# Patient Record
Sex: FEMALE | Race: WHITE | Employment: OTHER | URBAN - METROPOLITAN AREA
[De-identification: names, ages, dates, MRNs, and addresses within clinical notes are randomized per-mention and may not be internally consistent; named-entity substitution may affect disease eponyms.]

---

## 2020-04-04 ENCOUNTER — APPOINTMENT (OUTPATIENT)
Dept: GENERAL RADIOLOGY | Age: 65
DRG: 683 | End: 2020-04-04
Payer: MEDICARE

## 2020-04-04 ENCOUNTER — APPOINTMENT (OUTPATIENT)
Dept: CT IMAGING | Age: 65
DRG: 683 | End: 2020-04-04
Payer: MEDICARE

## 2020-04-04 ENCOUNTER — HOSPITAL ENCOUNTER (INPATIENT)
Age: 65
LOS: 3 days | Discharge: PSYCHIATRIC OR SUBSTANCE USE FACILITY | DRG: 683 | End: 2020-04-07
Attending: EMERGENCY MEDICINE | Admitting: INTERNAL MEDICINE
Payer: MEDICARE

## 2020-04-04 PROBLEM — N17.9 AKI (ACUTE KIDNEY INJURY) (HCC): Status: ACTIVE | Noted: 2020-04-04

## 2020-04-04 LAB
ACETAMINOPHEN LEVEL: <5 MCG/ML (ref 10–30)
ALBUMIN SERPL-MCNC: 3.9 G/DL (ref 3.5–5.2)
ALP BLD-CCNC: 81 U/L (ref 35–104)
ALT SERPL-CCNC: 28 U/L (ref 0–32)
ANION GAP SERPL CALCULATED.3IONS-SCNC: 15 MMOL/L (ref 7–16)
ANION GAP SERPL CALCULATED.3IONS-SCNC: 20 MMOL/L (ref 7–16)
AST SERPL-CCNC: 85 U/L (ref 0–31)
BASOPHILS ABSOLUTE: 0.03 E9/L (ref 0–0.2)
BASOPHILS RELATIVE PERCENT: 0.5 % (ref 0–2)
BILIRUB SERPL-MCNC: 0.3 MG/DL (ref 0–1.2)
BUN BLDV-MCNC: 50 MG/DL (ref 8–23)
BUN BLDV-MCNC: 52 MG/DL (ref 8–23)
CALCIUM SERPL-MCNC: 8.7 MG/DL (ref 8.6–10.2)
CALCIUM SERPL-MCNC: 9.1 MG/DL (ref 8.6–10.2)
CHLORIDE BLD-SCNC: 100 MMOL/L (ref 98–107)
CHLORIDE BLD-SCNC: 94 MMOL/L (ref 98–107)
CO2: 15 MMOL/L (ref 22–29)
CO2: 17 MMOL/L (ref 22–29)
CREAT SERPL-MCNC: 2.1 MG/DL (ref 0.5–1)
CREAT SERPL-MCNC: 2.2 MG/DL (ref 0.5–1)
EKG ATRIAL RATE: 86 BPM
EKG P AXIS: 47 DEGREES
EKG P-R INTERVAL: 148 MS
EKG Q-T INTERVAL: 404 MS
EKG QRS DURATION: 96 MS
EKG QTC CALCULATION (BAZETT): 483 MS
EKG R AXIS: 43 DEGREES
EKG T AXIS: 33 DEGREES
EKG VENTRICULAR RATE: 86 BPM
EOSINOPHILS ABSOLUTE: 0.07 E9/L (ref 0.05–0.5)
EOSINOPHILS RELATIVE PERCENT: 1.2 % (ref 0–6)
ETHANOL: <10 MG/DL (ref 0–0.08)
GFR AFRICAN AMERICAN: 27
GFR AFRICAN AMERICAN: 29
GFR NON-AFRICAN AMERICAN: 22 ML/MIN/1.73
GFR NON-AFRICAN AMERICAN: 24 ML/MIN/1.73
GLUCOSE BLD-MCNC: 102 MG/DL (ref 74–99)
GLUCOSE BLD-MCNC: 73 MG/DL (ref 74–99)
HCT VFR BLD CALC: 38.3 % (ref 34–48)
HEMOGLOBIN: 12.6 G/DL (ref 11.5–15.5)
IMMATURE GRANULOCYTES #: 0.02 E9/L
IMMATURE GRANULOCYTES %: 0.3 % (ref 0–5)
LACTIC ACID: 0.9 MMOL/L (ref 0.5–2.2)
LYMPHOCYTES ABSOLUTE: 1.79 E9/L (ref 1.5–4)
LYMPHOCYTES RELATIVE PERCENT: 30.7 % (ref 20–42)
MCH RBC QN AUTO: 31.2 PG (ref 26–35)
MCHC RBC AUTO-ENTMCNC: 32.9 % (ref 32–34.5)
MCV RBC AUTO: 94.8 FL (ref 80–99.9)
MONOCYTES ABSOLUTE: 0.63 E9/L (ref 0.1–0.95)
MONOCYTES RELATIVE PERCENT: 10.8 % (ref 2–12)
NEUTROPHILS ABSOLUTE: 3.29 E9/L (ref 1.8–7.3)
NEUTROPHILS RELATIVE PERCENT: 56.5 % (ref 43–80)
PDW BLD-RTO: 12.7 FL (ref 11.5–15)
PLATELET # BLD: 252 E9/L (ref 130–450)
PMV BLD AUTO: 11.5 FL (ref 7–12)
POTASSIUM REFLEX MAGNESIUM: 4.6 MMOL/L (ref 3.5–5)
POTASSIUM SERPL-SCNC: 3.7 MMOL/L (ref 3.5–5)
RBC # BLD: 4.04 E12/L (ref 3.5–5.5)
REASON FOR REJECTION: NORMAL
REJECTED TEST: NORMAL
SALICYLATE, SERUM: <0.3 MG/DL (ref 0–30)
SODIUM BLD-SCNC: 129 MMOL/L (ref 132–146)
SODIUM BLD-SCNC: 132 MMOL/L (ref 132–146)
TOTAL PROTEIN: 6.8 G/DL (ref 6.4–8.3)
TRICYCLIC ANTIDEPRESSANTS SCREEN SERUM: POSITIVE NG/ML
TROPONIN: <0.01 NG/ML (ref 0–0.03)
WBC # BLD: 5.8 E9/L (ref 4.5–11.5)

## 2020-04-04 PROCEDURE — 80053 COMPREHEN METABOLIC PANEL: CPT

## 2020-04-04 PROCEDURE — 93005 ELECTROCARDIOGRAM TRACING: CPT | Performed by: STUDENT IN AN ORGANIZED HEALTH CARE EDUCATION/TRAINING PROGRAM

## 2020-04-04 PROCEDURE — 84484 ASSAY OF TROPONIN QUANT: CPT

## 2020-04-04 PROCEDURE — 87040 BLOOD CULTURE FOR BACTERIA: CPT

## 2020-04-04 PROCEDURE — 36415 COLL VENOUS BLD VENIPUNCTURE: CPT

## 2020-04-04 PROCEDURE — 80307 DRUG TEST PRSMV CHEM ANLYZR: CPT

## 2020-04-04 PROCEDURE — 70450 CT HEAD/BRAIN W/O DYE: CPT

## 2020-04-04 PROCEDURE — 2580000003 HC RX 258: Performed by: INTERNAL MEDICINE

## 2020-04-04 PROCEDURE — 99285 EMERGENCY DEPT VISIT HI MDM: CPT

## 2020-04-04 PROCEDURE — 93010 ELECTROCARDIOGRAM REPORT: CPT | Performed by: INTERNAL MEDICINE

## 2020-04-04 PROCEDURE — 71045 X-RAY EXAM CHEST 1 VIEW: CPT

## 2020-04-04 PROCEDURE — 72125 CT NECK SPINE W/O DYE: CPT

## 2020-04-04 PROCEDURE — 85025 COMPLETE CBC W/AUTO DIFF WBC: CPT

## 2020-04-04 PROCEDURE — 1200000000 HC SEMI PRIVATE

## 2020-04-04 PROCEDURE — 80048 BASIC METABOLIC PNL TOTAL CA: CPT

## 2020-04-04 PROCEDURE — 6370000000 HC RX 637 (ALT 250 FOR IP): Performed by: INTERNAL MEDICINE

## 2020-04-04 PROCEDURE — 83605 ASSAY OF LACTIC ACID: CPT

## 2020-04-04 PROCEDURE — G0480 DRUG TEST DEF 1-7 CLASSES: HCPCS

## 2020-04-04 RX ORDER — CYCLOBENZAPRINE HCL 10 MG
10 TABLET ORAL 3 TIMES DAILY PRN
Status: DISCONTINUED | OUTPATIENT
Start: 2020-04-04 | End: 2020-04-04

## 2020-04-04 RX ORDER — MONTELUKAST SODIUM 10 MG/1
10 TABLET ORAL NIGHTLY
Status: DISCONTINUED | OUTPATIENT
Start: 2020-04-04 | End: 2020-04-07 | Stop reason: HOSPADM

## 2020-04-04 RX ORDER — SODIUM CHLORIDE 0.9 % (FLUSH) 0.9 %
10 SYRINGE (ML) INJECTION EVERY 12 HOURS SCHEDULED
Status: DISCONTINUED | OUTPATIENT
Start: 2020-04-04 | End: 2020-04-07 | Stop reason: HOSPADM

## 2020-04-04 RX ORDER — CYCLOBENZAPRINE HCL 10 MG
10 TABLET ORAL 3 TIMES DAILY
COMMUNITY
Start: 2020-02-12

## 2020-04-04 RX ORDER — 0.9 % SODIUM CHLORIDE 0.9 %
1000 INTRAVENOUS SOLUTION INTRAVENOUS ONCE
Status: DISCONTINUED | OUTPATIENT
Start: 2020-04-04 | End: 2020-04-07 | Stop reason: HOSPADM

## 2020-04-04 RX ORDER — FLUTICASONE PROPIONATE 50 MCG
2 SPRAY, SUSPENSION (ML) NASAL DAILY
COMMUNITY
Start: 2019-12-30

## 2020-04-04 RX ORDER — HYDROCODONE BITARTRATE AND ACETAMINOPHEN 7.5; 325 MG/1; MG/1
1 TABLET ORAL EVERY 6 HOURS PRN
Status: DISCONTINUED | OUTPATIENT
Start: 2020-04-04 | End: 2020-04-04

## 2020-04-04 RX ORDER — HYDROCODONE BITARTRATE AND ACETAMINOPHEN 10; 325 MG/1; MG/1
1 TABLET ORAL EVERY 4 HOURS PRN
Status: DISCONTINUED | OUTPATIENT
Start: 2020-04-04 | End: 2020-04-07 | Stop reason: HOSPADM

## 2020-04-04 RX ORDER — SODIUM CHLORIDE 0.9 % (FLUSH) 0.9 %
10 SYRINGE (ML) INJECTION PRN
Status: DISCONTINUED | OUTPATIENT
Start: 2020-04-04 | End: 2020-04-07 | Stop reason: HOSPADM

## 2020-04-04 RX ORDER — POLYETHYLENE GLYCOL 3350 17 G/17G
17 POWDER, FOR SOLUTION ORAL DAILY PRN
Status: DISCONTINUED | OUTPATIENT
Start: 2020-04-04 | End: 2020-04-07 | Stop reason: HOSPADM

## 2020-04-04 RX ORDER — SODIUM CHLORIDE 9 MG/ML
INJECTION, SOLUTION INTRAVENOUS CONTINUOUS
Status: DISCONTINUED | OUTPATIENT
Start: 2020-04-05 | End: 2020-04-05

## 2020-04-04 RX ORDER — LORATADINE 10 MG/1
10 TABLET ORAL DAILY PRN
COMMUNITY
Start: 2020-02-03

## 2020-04-04 RX ORDER — ALPRAZOLAM 1 MG/1
1 TABLET ORAL 2 TIMES DAILY PRN
COMMUNITY
Start: 2020-03-06

## 2020-04-04 RX ORDER — PROMETHAZINE HYDROCHLORIDE 25 MG/1
12.5 TABLET ORAL EVERY 6 HOURS PRN
Status: DISCONTINUED | OUTPATIENT
Start: 2020-04-04 | End: 2020-04-07 | Stop reason: HOSPADM

## 2020-04-04 RX ORDER — HYDROCODONE BITARTRATE AND ACETAMINOPHEN 10; 325 MG/1; MG/1
1 TABLET ORAL EVERY 4 HOURS PRN
COMMUNITY
Start: 2020-02-28

## 2020-04-04 RX ORDER — ACETAMINOPHEN 650 MG/1
650 SUPPOSITORY RECTAL EVERY 6 HOURS PRN
Status: DISCONTINUED | OUTPATIENT
Start: 2020-04-04 | End: 2020-04-04

## 2020-04-04 RX ORDER — QUETIAPINE FUMARATE 100 MG/1
100 TABLET, FILM COATED ORAL 2 TIMES DAILY
Status: ON HOLD | COMMUNITY
End: 2020-04-07 | Stop reason: HOSPADM

## 2020-04-04 RX ORDER — SODIUM CHLORIDE 9 MG/ML
INJECTION, SOLUTION INTRAVENOUS CONTINUOUS
Status: DISCONTINUED | OUTPATIENT
Start: 2020-04-04 | End: 2020-04-05

## 2020-04-04 RX ORDER — ONDANSETRON 2 MG/ML
4 INJECTION INTRAMUSCULAR; INTRAVENOUS EVERY 6 HOURS PRN
Status: DISCONTINUED | OUTPATIENT
Start: 2020-04-04 | End: 2020-04-07 | Stop reason: HOSPADM

## 2020-04-04 RX ORDER — ACETAMINOPHEN 325 MG/1
650 TABLET ORAL EVERY 6 HOURS PRN
Status: DISCONTINUED | OUTPATIENT
Start: 2020-04-04 | End: 2020-04-07 | Stop reason: HOSPADM

## 2020-04-04 RX ORDER — CYCLOBENZAPRINE HCL 10 MG
10 TABLET ORAL 3 TIMES DAILY
Status: DISCONTINUED | OUTPATIENT
Start: 2020-04-04 | End: 2020-04-07 | Stop reason: HOSPADM

## 2020-04-04 RX ORDER — QUETIAPINE FUMARATE 100 MG/1
100 TABLET, FILM COATED ORAL 2 TIMES DAILY
Status: DISCONTINUED | OUTPATIENT
Start: 2020-04-04 | End: 2020-04-06

## 2020-04-04 RX ORDER — HYDROCODONE BITARTRATE AND ACETAMINOPHEN 5; 325 MG/1; MG/1
1 TABLET ORAL EVERY 6 HOURS PRN
Status: DISCONTINUED | OUTPATIENT
Start: 2020-04-04 | End: 2020-04-04

## 2020-04-04 RX ORDER — LORAZEPAM 2 MG/ML
0.5 INJECTION INTRAMUSCULAR EVERY 12 HOURS PRN
Status: DISCONTINUED | OUTPATIENT
Start: 2020-04-04 | End: 2020-04-07 | Stop reason: HOSPADM

## 2020-04-04 RX ORDER — FLUTICASONE PROPIONATE 50 MCG
2 SPRAY, SUSPENSION (ML) NASAL DAILY
Status: DISCONTINUED | OUTPATIENT
Start: 2020-04-04 | End: 2020-04-07 | Stop reason: HOSPADM

## 2020-04-04 RX ORDER — MONTELUKAST SODIUM 10 MG/1
10 TABLET ORAL NIGHTLY
COMMUNITY
Start: 2020-01-16

## 2020-04-04 RX ORDER — LOSARTAN POTASSIUM 50 MG/1
50 TABLET ORAL DAILY
Status: ON HOLD | COMMUNITY
Start: 2019-08-07 | End: 2020-04-07 | Stop reason: HOSPADM

## 2020-04-04 RX ORDER — ACETAMINOPHEN 325 MG/1
650 TABLET ORAL EVERY 6 HOURS PRN
Status: DISCONTINUED | OUTPATIENT
Start: 2020-04-04 | End: 2020-04-04

## 2020-04-04 RX ADMIN — MONTELUKAST SODIUM 10 MG: 10 TABLET, FILM COATED ORAL at 21:01

## 2020-04-04 RX ADMIN — QUETIAPINE FUMARATE 100 MG: 100 TABLET ORAL at 21:02

## 2020-04-04 RX ADMIN — Medication 10 ML: at 21:07

## 2020-04-04 RX ADMIN — HYDROCODONE BITARTRATE AND ACETAMINOPHEN 1 TABLET: 10; 325 TABLET ORAL at 21:02

## 2020-04-04 RX ADMIN — SODIUM CHLORIDE: 9 INJECTION, SOLUTION INTRAVENOUS at 19:47

## 2020-04-04 RX ADMIN — CYCLOBENZAPRINE 10 MG: 10 TABLET, FILM COATED ORAL at 21:03

## 2020-04-04 ASSESSMENT — PAIN SCALES - GENERAL
PAINLEVEL_OUTOF10: 8
PAINLEVEL_OUTOF10: 0

## 2020-04-04 NOTE — H&P
masses or organomegaly  · Extremities: no cyanosis and no clubbing  · Musculoskeletal: normal range of motion, no joint swelling, deformity or tenderness  · Neurologic: speech normal   Labs and Imaging Studies   Basic Labs  Recent Labs     04/04/20  1350   *   K 4.6   CL 94*   CO2 15*   BUN 52*   CREATININE 2.2*   GLUCOSE 73*   CALCIUM 9.1       Recent Labs     04/04/20  1350   WBC 5.8   RBC 4.04   HGB 12.6   HCT 38.3   MCV 94.8   MCH 31.2   MCHC 32.9   RDW 12.7      MPV 11.5       Imaging Studies:     Ct Head Wo Contrast  Result Date: 4/4/2020    Unremarkable CT of the head. Ct Cervical Spine Wo Contrast  Result Date: 4/4/2020    1. No fracture or joint dislocation. 2. Postoperative changes, as described (cage reconstruction of C5 and C6 vertebral bodies as well as the posterior body fusion from C3-C7). Xr Chest Portable  Result Date: 4/4/2020     No acute cardiopulmonary abnormality. EKG:     Resident's Assessment and Plan     Nixon Barnes is a 71 y.o. female    1. Fall   - Mechanical?, but likely 2/2 dehydration and low BP  - Patient stated that she tripped and fell  - BP after the fall was 54/32 with a repeat of 74/40  -CT head and CT C-spine unremarkable  - S/p 2 L IVF in the ED  -Continue to monitor vitals   -  2. GORAN, likely prerenal 2/2 dehydration and poor oral intake, as well as use of ACEI with ARB  - BUN/creatinine 52/2.2. Normal baseline creatinine of 1.0 on 3/26  - [facility]ril 10 and [facility]   - S/p 2 L IVF in ED   -Start NS at 150 cc/h for 12 hours then 75 cc after that  -Follow-up urine electrolytes   -  3. Hypovolemic hyponatremia likely 2/2 dehydration, poor oral intake/solute intake  - Na 129, Cl 94 on admission  -Follow-up urine electrolytes  -Monitor BMP every 12 hours  -Continue IVF   -  4.  HAGMA likely 2/2 starvation ketosis, rule out infection  -Patient admitted to poor oral intake for the last couple of weeks  -CO2 15, AG 20  -Lactic acid 0.9  -CXR unremarkable, no URI symptoms  -Blood and urine culture sent to rule out infectious etiology  -Follow-up UDS, SDS (rule out aspirin or Tylenol toxicity)   -  5. Transaminitis  -AST/ALT 85/28, consistent with alcoholic picture  -Patient has been at [facility] since 3/27, and denied any recent drinking  -We will monitor for withdrawals, no need for CIWA at this point   -  6. Anxiety  -On Ativan 1 mg twice daily as needed  -We will put her on Ativan 0.5 mg twice daily as needed  -Also on Seroquel 100 mg twice daily   -  7. Chronic neck pain and spinal stenosis  - S/p fusion in the past  -On Flexeril 10 mg twice daily and Norco 10 q4 prn - resumed    -  8.  HTN   - Was taking lisinopril 10 and losartan 50 at [facility]   - Will hold all antihypertension meds for now    PT/OT evaluation: not ordered   DVT prophylaxis/ GI prophylaxis: Lovenox/diet  Disposition: Inpatient    Tabby Villa MD, PGY-1   Attending physician: Dr. Roselyn Lewis

## 2020-04-04 NOTE — ED PROVIDER NOTES
[meloxicam]    -------------------------------------------------- RESULTS -------------------------------------------------    LABS:  Results for orders placed or performed during the hospital encounter of 04/04/20   CBC auto differential   Result Value Ref Range    WBC 5.8 4.5 - 11.5 E9/L    RBC 4.04 3.50 - 5.50 E12/L    Hemoglobin 12.6 11.5 - 15.5 g/dL    Hematocrit 38.3 34.0 - 48.0 %    MCV 94.8 80.0 - 99.9 fL    MCH 31.2 26.0 - 35.0 pg    MCHC 32.9 32.0 - 34.5 %    RDW 12.7 11.5 - 15.0 fL    Platelets 780 516 - 136 E9/L    MPV 11.5 7.0 - 12.0 fL    Neutrophils % 56.5 43.0 - 80.0 %    Immature Granulocytes % 0.3 0.0 - 5.0 %    Lymphocytes % 30.7 20.0 - 42.0 %    Monocytes % 10.8 2.0 - 12.0 %    Eosinophils % 1.2 0.0 - 6.0 %    Basophils % 0.5 0.0 - 2.0 %    Neutrophils Absolute 3.29 1.80 - 7.30 E9/L    Immature Granulocytes # 0.02 E9/L    Lymphocytes Absolute 1.79 1.50 - 4.00 E9/L    Monocytes Absolute 0.63 0.10 - 0.95 E9/L    Eosinophils Absolute 0.07 0.05 - 0.50 E9/L    Basophils Absolute 0.03 0.00 - 0.20 E9/L   Comprehensive Metabolic Panel w/ Reflex to MG   Result Value Ref Range    Sodium 129 (L) 132 - 146 mmol/L    Potassium reflex Magnesium 4.6 3.5 - 5.0 mmol/L    Chloride 94 (L) 98 - 107 mmol/L    CO2 15 (L) 22 - 29 mmol/L    Anion Gap 20 (H) 7 - 16 mmol/L    Glucose 73 (L) 74 - 99 mg/dL    BUN 52 (H) 8 - 23 mg/dL    CREATININE 2.2 (H) 0.5 - 1.0 mg/dL    GFR Non-African American 22 >=60 mL/min/1.73    GFR African American 27     Calcium 9.1 8.6 - 10.2 mg/dL    Total Protein 6.8 6.4 - 8.3 g/dL    Alb 3.9 3.5 - 5.2 g/dL    Total Bilirubin 0.3 0.0 - 1.2 mg/dL    Alkaline Phosphatase 81 35 - 104 U/L    ALT 28 0 - 32 U/L    AST 85 (H) 0 - 31 U/L   Troponin   Result Value Ref Range    Troponin <0.01 0.00 - 0.03 ng/mL   Lactic Acid, Plasma   Result Value Ref Range    Lactic Acid 0.9 0.5 - 2.2 mmol/L   SPECIMEN REJECTION   Result Value Ref Range    Rejected Test SDS     Reason for Rejection see below

## 2020-04-05 LAB
AMPHETAMINE SCREEN, URINE: NOT DETECTED
ANION GAP SERPL CALCULATED.3IONS-SCNC: 10 MMOL/L (ref 7–16)
ANION GAP SERPL CALCULATED.3IONS-SCNC: 12 MMOL/L (ref 7–16)
ANION GAP SERPL CALCULATED.3IONS-SCNC: 15 MMOL/L (ref 7–16)
BACTERIA: ABNORMAL /HPF
BARBITURATE SCREEN URINE: NOT DETECTED
BASOPHILS ABSOLUTE: 0.02 E9/L (ref 0–0.2)
BASOPHILS RELATIVE PERCENT: 0.4 % (ref 0–2)
BENZODIAZEPINE SCREEN, URINE: NOT DETECTED
BILIRUBIN URINE: NEGATIVE
BLOOD, URINE: ABNORMAL
BUN BLDV-MCNC: 21 MG/DL (ref 8–23)
BUN BLDV-MCNC: 28 MG/DL (ref 8–23)
BUN BLDV-MCNC: 38 MG/DL (ref 8–23)
CALCIUM SERPL-MCNC: 7.9 MG/DL (ref 8.6–10.2)
CALCIUM SERPL-MCNC: 8.4 MG/DL (ref 8.6–10.2)
CALCIUM SERPL-MCNC: 9 MG/DL (ref 8.6–10.2)
CANNABINOID SCREEN URINE: NOT DETECTED
CHLORIDE BLD-SCNC: 103 MMOL/L (ref 98–107)
CHLORIDE BLD-SCNC: 105 MMOL/L (ref 98–107)
CHLORIDE BLD-SCNC: 107 MMOL/L (ref 98–107)
CHLORIDE URINE RANDOM: 27 MMOL/L
CLARITY: CLEAR
CO2: 17 MMOL/L (ref 22–29)
CO2: 19 MMOL/L (ref 22–29)
CO2: 21 MMOL/L (ref 22–29)
COCAINE METABOLITE SCREEN URINE: NOT DETECTED
COLOR: YELLOW
CREAT SERPL-MCNC: 1.2 MG/DL (ref 0.5–1)
CREAT SERPL-MCNC: 1.3 MG/DL (ref 0.5–1)
CREAT SERPL-MCNC: 1.6 MG/DL (ref 0.5–1)
CREATININE URINE: 35 MG/DL (ref 29–226)
EOSINOPHILS ABSOLUTE: 0.12 E9/L (ref 0.05–0.5)
EOSINOPHILS RELATIVE PERCENT: 2.6 % (ref 0–6)
EPITHELIAL CELLS, UA: ABNORMAL /HPF
FENTANYL SCREEN, URINE: NOT DETECTED
GFR AFRICAN AMERICAN: 39
GFR AFRICAN AMERICAN: 50
GFR AFRICAN AMERICAN: 54
GFR NON-AFRICAN AMERICAN: 32 ML/MIN/1.73
GFR NON-AFRICAN AMERICAN: 41 ML/MIN/1.73
GFR NON-AFRICAN AMERICAN: 45 ML/MIN/1.73
GLUCOSE BLD-MCNC: 106 MG/DL (ref 74–99)
GLUCOSE BLD-MCNC: 266 MG/DL (ref 74–99)
GLUCOSE BLD-MCNC: 82 MG/DL (ref 74–99)
GLUCOSE URINE: NEGATIVE MG/DL
HCT VFR BLD CALC: 34.3 % (ref 34–48)
HEMOGLOBIN: 11.1 G/DL (ref 11.5–15.5)
IMMATURE GRANULOCYTES #: 0.01 E9/L
IMMATURE GRANULOCYTES %: 0.2 % (ref 0–5)
KETONES, URINE: NEGATIVE MG/DL
LEUKOCYTE ESTERASE, URINE: NEGATIVE
LYMPHOCYTES ABSOLUTE: 1.65 E9/L (ref 1.5–4)
LYMPHOCYTES RELATIVE PERCENT: 35.1 % (ref 20–42)
Lab: NORMAL
MCH RBC QN AUTO: 30.7 PG (ref 26–35)
MCHC RBC AUTO-ENTMCNC: 32.4 % (ref 32–34.5)
MCV RBC AUTO: 95 FL (ref 80–99.9)
METHADONE SCREEN, URINE: NOT DETECTED
MONOCYTES ABSOLUTE: 0.59 E9/L (ref 0.1–0.95)
MONOCYTES RELATIVE PERCENT: 12.6 % (ref 2–12)
NEUTROPHILS ABSOLUTE: 2.31 E9/L (ref 1.8–7.3)
NEUTROPHILS RELATIVE PERCENT: 49.1 % (ref 43–80)
NITRITE, URINE: NEGATIVE
OPIATE SCREEN URINE: NOT DETECTED
OSMOLALITY: 295 MOSM/KG (ref 285–310)
OXYCODONE URINE: NOT DETECTED
PDW BLD-RTO: 12.9 FL (ref 11.5–15)
PH UA: 6 (ref 5–9)
PHENCYCLIDINE SCREEN URINE: NOT DETECTED
PLATELET # BLD: 221 E9/L (ref 130–450)
PMV BLD AUTO: 11.1 FL (ref 7–12)
POTASSIUM SERPL-SCNC: 3.5 MMOL/L (ref 3.5–5)
POTASSIUM SERPL-SCNC: 3.6 MMOL/L (ref 3.5–5)
POTASSIUM SERPL-SCNC: 4.2 MMOL/L (ref 3.5–5)
POTASSIUM, UR: 6.3 MMOL/L
PROTEIN UA: NEGATIVE MG/DL
RBC # BLD: 3.61 E12/L (ref 3.5–5.5)
RBC UA: ABNORMAL /HPF (ref 0–2)
SODIUM BLD-SCNC: 132 MMOL/L (ref 132–146)
SODIUM BLD-SCNC: 138 MMOL/L (ref 132–146)
SODIUM BLD-SCNC: 139 MMOL/L (ref 132–146)
SODIUM URINE: 38 MMOL/L
SPECIFIC GRAVITY UA: 1.01 (ref 1–1.03)
UROBILINOGEN, URINE: 0.2 E.U./DL
WBC # BLD: 4.7 E9/L (ref 4.5–11.5)
WBC UA: ABNORMAL /HPF (ref 0–5)

## 2020-04-05 PROCEDURE — 2580000003 HC RX 258: Performed by: INTERNAL MEDICINE

## 2020-04-05 PROCEDURE — 99222 1ST HOSP IP/OBS MODERATE 55: CPT | Performed by: INTERNAL MEDICINE

## 2020-04-05 PROCEDURE — 81001 URINALYSIS AUTO W/SCOPE: CPT

## 2020-04-05 PROCEDURE — 36415 COLL VENOUS BLD VENIPUNCTURE: CPT

## 2020-04-05 PROCEDURE — 84300 ASSAY OF URINE SODIUM: CPT

## 2020-04-05 PROCEDURE — 83930 ASSAY OF BLOOD OSMOLALITY: CPT

## 2020-04-05 PROCEDURE — 82570 ASSAY OF URINE CREATININE: CPT

## 2020-04-05 PROCEDURE — 1200000000 HC SEMI PRIVATE

## 2020-04-05 PROCEDURE — 87088 URINE BACTERIA CULTURE: CPT

## 2020-04-05 PROCEDURE — 80048 BASIC METABOLIC PNL TOTAL CA: CPT

## 2020-04-05 PROCEDURE — 82436 ASSAY OF URINE CHLORIDE: CPT

## 2020-04-05 PROCEDURE — 84133 ASSAY OF URINE POTASSIUM: CPT

## 2020-04-05 PROCEDURE — 85025 COMPLETE CBC W/AUTO DIFF WBC: CPT

## 2020-04-05 PROCEDURE — 6370000000 HC RX 637 (ALT 250 FOR IP): Performed by: INTERNAL MEDICINE

## 2020-04-05 PROCEDURE — 80307 DRUG TEST PRSMV CHEM ANLYZR: CPT

## 2020-04-05 RX ORDER — DEXTROSE MONOHYDRATE 50 MG/ML
INJECTION, SOLUTION INTRAVENOUS CONTINUOUS
Status: DISCONTINUED | OUTPATIENT
Start: 2020-04-05 | End: 2020-04-05

## 2020-04-05 RX ORDER — DEXTROSE AND SODIUM CHLORIDE 5; .45 G/100ML; G/100ML
INJECTION, SOLUTION INTRAVENOUS CONTINUOUS
Status: DISCONTINUED | OUTPATIENT
Start: 2020-04-05 | End: 2020-04-06

## 2020-04-05 RX ORDER — NICOTINE 21 MG/24HR
1 PATCH, TRANSDERMAL 24 HOURS TRANSDERMAL DAILY
Status: DISCONTINUED | OUTPATIENT
Start: 2020-04-05 | End: 2020-04-07 | Stop reason: HOSPADM

## 2020-04-05 RX ADMIN — DEXTROSE MONOHYDRATE: 50 INJECTION, SOLUTION INTRAVENOUS at 08:47

## 2020-04-05 RX ADMIN — QUETIAPINE FUMARATE 100 MG: 100 TABLET ORAL at 08:51

## 2020-04-05 RX ADMIN — Medication 10 ML: at 08:46

## 2020-04-05 RX ADMIN — CYCLOBENZAPRINE 10 MG: 10 TABLET, FILM COATED ORAL at 14:08

## 2020-04-05 RX ADMIN — QUETIAPINE FUMARATE 100 MG: 100 TABLET ORAL at 20:19

## 2020-04-05 RX ADMIN — CYCLOBENZAPRINE 10 MG: 10 TABLET, FILM COATED ORAL at 20:18

## 2020-04-05 RX ADMIN — DEXTROSE AND SODIUM CHLORIDE: 5; 450 INJECTION, SOLUTION INTRAVENOUS at 13:22

## 2020-04-05 RX ADMIN — FLUTICASONE PROPIONATE 2 SPRAY: 50 SPRAY, METERED NASAL at 08:51

## 2020-04-05 RX ADMIN — CYCLOBENZAPRINE 10 MG: 10 TABLET, FILM COATED ORAL at 08:51

## 2020-04-05 RX ADMIN — MONTELUKAST SODIUM 10 MG: 10 TABLET, FILM COATED ORAL at 20:19

## 2020-04-05 ASSESSMENT — PAIN SCALES - GENERAL
PAINLEVEL_OUTOF10: 0

## 2020-04-05 NOTE — PROGRESS NOTES
[facility]  Internal Medicine Residency Program  Progress Note - House Team 2    Patient:  Randy Alejandra 71 y.o. female MRN: [id number]     Date of Service: 4/5/2020     CC: had concerns including Fall (Pt fell out of w/c. + head injury - thinners - LOC. Hypotensive for [facility] (78/50)). Subjective   Pt seen and examined this morning. VSS. Denies any acute complains. AOx4 at this moment but slightly confused and tangential speech. Denies any SI or HI. Cr trending down - 1.3 from 2.2   Na 129 -> 139 -> 132. Switched IVF to D5 1/2 NS   Was threatening to punch nurses if she doesn't leave today - due to underlying psych disorders, pt was pink slipped   Psych consult   Continue to monitor BMP     Objective     Physical Exam:  · Vitals: /70   Pulse 92   Temp 98.5 °F (36.9 °C) (Temporal)   Resp 18   Ht 5' 2\" (1.575 m)   Wt 124 lb (56.2 kg)   SpO2 97%   BMI 22.68 kg/m²     · I & O - 24hr: I/O this shift:  · In: 240 [P.O.:240]  · Out: -    · General Appearance: alert, appears stated age and cooperative  · HEENT:  Head: Normocephalic, no lesions, without obvious abnormality. · Neck: no carotid bruit, no JVD and supple, symmetrical, trachea midline  · Lung: clear to auscultation bilaterally  · Heart: regular rate and rhythm, S1, S2 normal, no murmur, click, rub or gallop  · Abdomen: soft, non-tender; bowel sounds normal; no masses,  no organomegaly  · Extremities:  extremities normal, atraumatic, no cyanosis or edema  · Musculokeletal: No joint swelling, no muscle tenderness. ROM normal in all joints of extremities. · Neurologic: Mental status: Alert, oriented, thought content appropriate.  Tangential speech   Subject  Pertinent Labs & Imaging Studies   juanita  CBC:   Lab Results   Component Value Date    WBC 4.7 04/05/2020    RBC 3.61 04/05/2020    HGB 11.1 04/05/2020    HCT 34.3 04/05/2020    MCV 95.0 04/05/2020    MCH 30.7 04/05/2020    MCHC 32.4 04/05/2020    RDW 12.9 04/05/2020     04/05/2020    MPV 11.1 04/05/2020     BMP:    Lab Results   Component Value Date     04/05/2020    K 3.5 04/05/2020    K 4.6 04/04/2020     04/05/2020    CO2 17 04/05/2020    BUN 28 04/05/2020    LABALBU 3.9 04/04/2020    CREATININE 1.3 04/05/2020    CALCIUM 7.9 04/05/2020    GFRAA 50 04/05/2020    LABGLOM 41 04/05/2020    GLUCOSE 266 04/05/2020       Resident's Assessment and Plan     Jaimie Dale is a 71 y.o. female    1. Fall   - Mechanical?, but likely 2/2 dehydration and low BP  - Patient stated that she tripped and fell  - BP after the fall was 54/32 with a repeat of 74/40  - CT head and CT C-spine unremarkable  - S/p 2 L IVF in the ED  -Continue to monitor vitals              -  2. GORAN, likely prerenal 2/2 dehydration and poor oral intake, as well as use of ACEI with ARB  - BUN/creatinine 52/2.2. Normal baseline creatinine of 1.0 on 3/26  - Was taking lisinopril 10 and losartan 50 at [facility]   - S/p 2 L IVF in ED   -Start NS at 150 cc/h for 12 hours then 75 cc after that  -Switched to D5W half normal on 4/5, due to concern for Na correcting quickly   - FeNa 1.2%, intrinsic, likely from combination of ACEI and ARB  - Cr trending down, 1.3 this morning   - Continue to monitor BMP q12              -  3.   Hypovolemic hyponatremia likely 2/2 dehydration, poor oral intake/solute intake  - Na 129, Cl 94 on admission  -Monitor BMP every 12 hours  -Continue IVF D5W half NS               -  4. HAGMA likely 2/2 starvation ketosis, rule out infection  -Patient admitted to poor oral intake for the last couple of weeks  -CO2 15, AG 20  -Lactic acid 0.9  -CXR unremarkable, no URI symptoms  -Blood and urine culture sent to rule out infectious etiology  - UDs negative, SDS positive for TCA (was a home medication)               -  5.  Transaminitis  -AST/ALT 85/28, consistent with alcoholic picture  -Patient has been at [facility] since 3/27, and denied any recent drinking  -We

## 2020-04-06 LAB
ANION GAP SERPL CALCULATED.3IONS-SCNC: 13 MMOL/L (ref 7–16)
ANION GAP SERPL CALCULATED.3IONS-SCNC: 13 MMOL/L (ref 7–16)
BUN BLDV-MCNC: 10 MG/DL (ref 8–23)
BUN BLDV-MCNC: 14 MG/DL (ref 8–23)
CALCIUM SERPL-MCNC: 8.7 MG/DL (ref 8.6–10.2)
CALCIUM SERPL-MCNC: 8.9 MG/DL (ref 8.6–10.2)
CHLORIDE BLD-SCNC: 107 MMOL/L (ref 98–107)
CHLORIDE BLD-SCNC: 109 MMOL/L (ref 98–107)
CO2: 19 MMOL/L (ref 22–29)
CO2: 23 MMOL/L (ref 22–29)
CREAT SERPL-MCNC: 1 MG/DL (ref 0.5–1)
CREAT SERPL-MCNC: 1.1 MG/DL (ref 0.5–1)
GFR AFRICAN AMERICAN: >60
GFR AFRICAN AMERICAN: >60
GFR NON-AFRICAN AMERICAN: 50 ML/MIN/1.73
GFR NON-AFRICAN AMERICAN: 56 ML/MIN/1.73
GLUCOSE BLD-MCNC: 101 MG/DL (ref 74–99)
GLUCOSE BLD-MCNC: 103 MG/DL (ref 74–99)
HEPATITIS C ANTIBODY INTERPRETATION: NORMAL
POTASSIUM SERPL-SCNC: 3.7 MMOL/L (ref 3.5–5)
POTASSIUM SERPL-SCNC: 4.2 MMOL/L (ref 3.5–5)
SODIUM BLD-SCNC: 141 MMOL/L (ref 132–146)
SODIUM BLD-SCNC: 143 MMOL/L (ref 132–146)

## 2020-04-06 PROCEDURE — 86803 HEPATITIS C AB TEST: CPT

## 2020-04-06 PROCEDURE — 2580000003 HC RX 258: Performed by: INTERNAL MEDICINE

## 2020-04-06 PROCEDURE — 6370000000 HC RX 637 (ALT 250 FOR IP): Performed by: INTERNAL MEDICINE

## 2020-04-06 PROCEDURE — 97165 OT EVAL LOW COMPLEX 30 MIN: CPT

## 2020-04-06 PROCEDURE — 99232 SBSQ HOSP IP/OBS MODERATE 35: CPT | Performed by: INTERNAL MEDICINE

## 2020-04-06 PROCEDURE — 99231 SBSQ HOSP IP/OBS SF/LOW 25: CPT | Performed by: NURSE PRACTITIONER

## 2020-04-06 PROCEDURE — 80048 BASIC METABOLIC PNL TOTAL CA: CPT

## 2020-04-06 PROCEDURE — 36415 COLL VENOUS BLD VENIPUNCTURE: CPT

## 2020-04-06 PROCEDURE — 1200000000 HC SEMI PRIVATE

## 2020-04-06 PROCEDURE — 97161 PT EVAL LOW COMPLEX 20 MIN: CPT

## 2020-04-06 RX ADMIN — MONTELUKAST SODIUM 10 MG: 10 TABLET, FILM COATED ORAL at 21:12

## 2020-04-06 RX ADMIN — Medication 10 ML: at 08:02

## 2020-04-06 RX ADMIN — QUETIAPINE FUMARATE 100 MG: 100 TABLET ORAL at 08:02

## 2020-04-06 RX ADMIN — CYCLOBENZAPRINE 10 MG: 10 TABLET, FILM COATED ORAL at 21:12

## 2020-04-06 RX ADMIN — CYCLOBENZAPRINE 10 MG: 10 TABLET, FILM COATED ORAL at 13:39

## 2020-04-06 RX ADMIN — CYCLOBENZAPRINE 10 MG: 10 TABLET, FILM COATED ORAL at 08:02

## 2020-04-06 RX ADMIN — FLUTICASONE PROPIONATE 2 SPRAY: 50 SPRAY, METERED NASAL at 08:02

## 2020-04-06 ASSESSMENT — PAIN SCALES - GENERAL
PAINLEVEL_OUTOF10: 0
PAINLEVEL_OUTOF10: 0

## 2020-04-06 NOTE — CONSULTS
not want to go back and live with a roommate to save some money and she is not sure where she is going to live. Past psychiatric history: Patient states that her hospitalization at [facility] was her first hospitalization. She denies any outpatient psychiatric treatment. She states she is never been on psychotropic medication in the past.  She states that was her first suicide attempt. She denies any when the family committed suicide she denies any when the family has any mental illness. Legal history: Patient denies any legal history    Substance abuse history: Patient states she \"partied. Heart\" when she was younger. She states currently she has a cocktail every night but denies any other drug or alcohol use. Personal family and social history: Patient states she grew up in [place] was raised by her mom and dad. She states she has 1 brother and 2 sisters. She graduated high school. She states he was  once currently . She has 2 sons she is her oldest son she has an estranged relationship with she states that he does not talk to her. She states her younger son she has a good relationship with but he is moving to [place] with his partner. 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 She lives with a roommate and she states it is \"not a good situation. \"      PAST MEDICAL HISTORY:       Diagnosis Date    Hypertension        MEDICAL ROS: General - negative, neurological - negative, ENT - negative, CV - negative, pulmonary - negative, GI - negative, dermatologic - negative, rheumatologic - negative, musculoskeletal - negative,  - negative, hematologic - negative    PAST SURGICAL HISTORY:       Procedure Laterality Date    BACK SURGERY         MEDICATIONS: Current Facility-Administered Medications: nicotine (NICODERM CQ) 14 MG/24HR 1 patch, 1 patch, Transdermal, Daily  0.9 % sodium chloride bolus, 1,000 < 200 ng/mL Final    Benzodiazepine Screen, Urine 04/05/2020 NOT DETECTED  Negative < 200 ng/mL Final    Cannabinoid Scrn, Ur 04/05/2020 NOT DETECTED  Negative < 50ng/mL Final    Cocaine Metabolite Screen, Urine 04/05/2020 NOT DETECTED  Negative < 300 ng/mL Final    Opiate Scrn, Ur 04/05/2020 NOT DETECTED  Negative < 300ng/mL Final    Note:  The Opiate Screen is not intended to detect Oxycodone.  PCP Screen, Urine 04/05/2020 NOT DETECTED  Negative < 25 ng/mL Final    Methadone Screen, Urine 04/05/2020 NOT DETECTED  Negative <300 ng/mL Final    Oxycodone Urine 04/05/2020 NOT DETECTED  Negative <100 ng/mL Final    FENTANYL SCREEN, URINE 04/05/2020 NOT DETECTED  Negative <1 ng/mL Final    Drug Screen Comment: 04/05/2020 see below   Final    Comment: These drug screen results are for medical purposes only and  should not be considered definitive or confirmed. The drug  methodology concentration value must be greater than or equal  to the cutoff to be reported as positive. Confirmatory testing  orders and/or interpretive screening questions can be directed  to toxicology at [phone]. The absence of expected drug(s) and/or metabolite(s) may be due  to inappropriate timing of specimen collection relative to drug  administration, poor drug absorption, diluted/adulterated urine,  or limitations of screening testing methodology.  Lactic Acid 04/04/2020 0.9  0.5 - 2.2 mmol/L Final    Rejected Test 04/04/2020 SDS   Final    Reason for Rejection 04/04/2020 see below   Final    Comment: Unable to perform testing; specimen grossly hemolyzed. To perform testing the specimen will need to be recollected.  Hemolyz      Sodium 04/04/2020 132  132 - 146 mmol/L Final    Potassium 04/04/2020 3.7  3.5 - 5.0 mmol/L Final    Chloride 04/04/2020 100  98 - 107 mmol/L Final    CO2 04/04/2020 17* 22 - 29 mmol/L Final    Anion Gap 04/04/2020 15  7 - 16 mmol/L Final    Glucose 04/04/2020 102* 74 - 99 mg/dL Final    - 15.0 fL Final    Platelets 27/94/8754 221  130 - 450 E9/L Final    MPV 04/05/2020 11.1  7.0 - 12.0 fL Final    Neutrophils % 04/05/2020 49.1  43.0 - 80.0 % Final    Immature Granulocytes % 04/05/2020 0.2  0.0 - 5.0 % Final    Lymphocytes % 04/05/2020 35.1  20.0 - 42.0 % Final    Monocytes % 04/05/2020 12.6* 2.0 - 12.0 % Final    Eosinophils % 04/05/2020 2.6  0.0 - 6.0 % Final    Basophils % 04/05/2020 0.4  0.0 - 2.0 % Final    Neutrophils Absolute 04/05/2020 2.31  1.80 - 7.30 E9/L Final    Immature Granulocytes # 04/05/2020 0.01  E9/L Final    Lymphocytes Absolute 04/05/2020 1.65  1.50 - 4.00 E9/L Final    Monocytes Absolute 04/05/2020 0.59  0.10 - 0.95 E9/L Final    Eosinophils Absolute 04/05/2020 0.12  0.05 - 0.50 E9/L Final    Basophils Absolute 04/05/2020 0.02  0.00 - 0.20 E9/L Final    Osmolality 04/05/2020 295  285 - 310 mOsm/Kg Final    Sodium 04/05/2020 138  132 - 146 mmol/L Final    Potassium 04/05/2020 3.6  3.5 - 5.0 mmol/L Final    Chloride 04/05/2020 107  98 - 107 mmol/L Final    CO2 04/05/2020 21* 22 - 29 mmol/L Final    Anion Gap 04/05/2020 10  7 - 16 mmol/L Final    Glucose 04/05/2020 106* 74 - 99 mg/dL Final    BUN 04/05/2020 21  8 - 23 mg/dL Final    CREATININE 04/05/2020 1.2* 0.5 - 1.0 mg/dL Final    GFR Non- 04/05/2020 45  >=60 mL/min/1.73 Final    Comment: Chronic Kidney Disease: less than 60 ml/min/1.73 sq.m. Kidney Failure: less than 15 ml/min/1.73 sq.m. Results valid for patients 18 years and older.       GFR  04/05/2020 54   Final    Calcium 04/05/2020 9.0  8.6 - 10.2 mg/dL Final    Sodium 04/05/2020 132  132 - 146 mmol/L Final    Potassium 04/05/2020 3.5  3.5 - 5.0 mmol/L Final    Chloride 04/05/2020 103  98 - 107 mmol/L Final    CO2 04/05/2020 17* 22 - 29 mmol/L Final    Anion Gap 04/05/2020 12  7 - 16 mmol/L Final    Glucose 04/05/2020 266* 74 - 99 mg/dL Final    BUN 04/05/2020 28* 8 - 23 mg/dL Final    CREATININE 04/05/2020 1.3* 0.5 - 1.0 mg/dL Final    GFR Non- 04/05/2020 41  >=60 mL/min/1.73 Final    Comment: Chronic Kidney Disease: less than 60 ml/min/1.73 sq.m. Kidney Failure: less than 15 ml/min/1.73 sq.m. Results valid for patients 18 years and older.  GFR  04/05/2020 50   Final    Calcium 04/05/2020 7.9* 8.6 - 10.2 mg/dL Final    Creatinine, Ur 04/05/2020 35  29 - 226 mg/dL Final    Sodium 04/06/2020 141  132 - 146 mmol/L Final    Potassium 04/06/2020 3.7  3.5 - 5.0 mmol/L Final    Chloride 04/06/2020 109* 98 - 107 mmol/L Final    CO2 04/06/2020 19* 22 - 29 mmol/L Final    Anion Gap 04/06/2020 13  7 - 16 mmol/L Final    Glucose 04/06/2020 103* 74 - 99 mg/dL Final    BUN 04/06/2020 14  8 - 23 mg/dL Final    CREATININE 04/06/2020 1.1* 0.5 - 1.0 mg/dL Final    GFR Non- 04/06/2020 50  >=60 mL/min/1.73 Final    Comment: Chronic Kidney Disease: less than 60 ml/min/1.73 sq.m. Kidney Failure: less than 15 ml/min/1.73 sq.m. Results valid for patients 18 years and older.  GFR  04/06/2020 >60   Final    Calcium 04/06/2020 8.7  8.6 - 10.2 mg/dL Final    Hep C Ab Interp 04/06/2020 Non-Reactive  NON REACT Final             MENTAL STATUS EXAMINATION  Appearance: Appropriately dressed Behavior: appropriate and pleasant Mood: \"My mood is good\" Affect: appropriate and pleasant Speech: Normal rate rhythm and tone thought Process: Confused at times thought Content: focused on her roommate perception: Devoid of any auditory or visual destinations orientation: Alert oriented time place and person concentration: Within normal limits memory: Appears to be baseline Fund of knowledge: Adequate Insight: Fair judgement: Fair      ASSESSMENT:   Mood disorder    PLAN & RECOMMENDATIONS: Patient seen and plan discussed with Dr. Yasmin Lincoln.   We recommend that once patient is medically cleared she be transferred back to [facility] for

## 2020-04-06 NOTE — CARE COORDINATION
Per psych note today, We recommend that once patient is medically cleared she be transferred back to [facility] for continuity of care and continued psychiatric treatment. Charge nurse is checking for discharge with internal med. I called and spoke to [facility] from [facility] phone# [phone] to inform her of what psych said. She said to fax her the referral to fax# [phone]. Await acceptance back from [facility].   Eugenia Craig RN CM

## 2020-04-06 NOTE — PROGRESS NOTES
Physical Therapy  Initial Assessment     Name: Newton Mendoza  : 1955  MRN: [id number]    Date of Service: 2020    Referring Provider: Lele Baker MD     Evaluating PT:  Mony Wright, PT, DPT, [id number]    Room #:  3134/2841-A  Diagnosis:  GORAN   Precautions:  Falls, psych history, CO in room   Equipment Needs:  none    Pt admitted from [address]. Ambulating with no device, independent. OBJECTIVE:   Initial Evaluation  Date:    AM-PAC 6 Clicks 53/   Was pt agreeable to Eval/treatment? Yes    Does pt have pain? Denies    Bed Mobility  Rolling: NT  Supine to sit: NT  Sit to supine: NT  Scooting: NT   -pt out of bed    Transfers Sit to stand: Independent   Stand to sit: Independent  Stand pivot: NT   Ambulation    50 feet with no AD Independent       Stair negotiation: ascended and descended  NT   ROM BUE:  See OT eval   BLE:  WFL   Strength BUE:  See OT eval   BLE:  5/5   Balance Sitting EOB:  NT  Dynamic Standing:  Independent     -Pt is A & O x 4  -Sensation:  Unremarkable   -Edema:  Unremarkable     Therapeutic Exercises:  Functional activity    Patient education  Pt educated on safety, sequencing of transfers, and role of PT    Patient response to education:   Pt verbalized understanding Pt demonstrated skill Pt requires further education in this area   Yes  Yes  No      ASSESSMENT:    Treatment:  Patient practiced and was instructed in the following treatment:  Patient education provided continuously throughout session for sequencing, safety maintenance, and improving any deficits found during the evaluation. Additional Comments:  Pt sitting in chair upon entry to room. Pt independent with all functional transfers and mobility. Ambulated 50 feet with no AD. Normal gait pattern and speed noted. Returned to room with all needs met. Pt at independent baseline with no skilled PT needs at this time. DC from PT services. Pt's/ family goals   1.  Return home    Patient and or family understand(s) diagnosis, prognosis, and plan of care. Yes     PLAN:    Pt at independent baseline with no skilled PT needs at this time. DC from PT services. Time in  1330  Time out  1335    Total Treatment Time 0 minutes     Evaluation Time includes thorough review of current medical information, gathering information on past medical history/social history and prior level of function, completion of standardized testing/informal observation of tasks, assessment of data and education on plan of care and goals.     CPT codes:  [x] Low Complexity PT evaluation 01709  [] Moderate Complexity PT evaluation 34961  [] High Complexity PT evaluation 60430  [] PT Re-evaluation 50263  [] Gait training 27320 0 minutes  [] Manual therapy 85450 0 minutes  [] Therapeutic activities 73420 0 minutes  [] Therapeutic exercises 36628 0 minutes  [] Neuromuscular reeducation 57702 0 minutes     Petros Shelton, PT, DPT  [id number]

## 2020-04-06 NOTE — PROGRESS NOTES
[facility]  Internal Medicine Residency Program  Progress Note - House Team 2    Patient:  Ana Cullen 71 y.o. female MRN: [id number]     Date of Service: 4/6/2020     CC: had concerns including Fall (Pt fell out of w/c. + head injury - thinners - LOC. Hypotensive for [facility] and EMS (78/50)). Subjective   Pt seen and examined this morning. VSS. Denies any acute complains at the moment. Slightly confused with tangential speech but AOx3. Not eating well    Cr trending down - almost at baseline   Bicarb improving   Orthostatics negative   PTOT   Plan to go back [facility]   F/u Psych recs   Objective     Physical Exam:  · Vitals: /64   Pulse 90   Temp 97.7 °F (36.5 °C) (Oral)   Resp 18   Ht 5' 2\" (1.575 m)   Wt 124 lb (56.2 kg)   SpO2 97%   BMI 22.68 kg/m²     · I & O - 24hr: I/O this shift:  · In: 240 [P.O.:240]  · Out: -    · General Appearance: alert, appears stated age and cooperative  · HEENT:  Head: Normocephalic, no lesions, without obvious abnormality. · Neck: no carotid bruit, no JVD, supple, symmetrical, trachea midline and thyroid not enlarged, symmetric, no tenderness/mass/nodules  · Lung: clear to auscultation bilaterally  · Heart: regular rate and rhythm, S1, S2 normal, no murmur, click, rub or gallop  · Abdomen: soft, non-tender; bowel sounds normal; no masses,  no organomegaly  · Extremities:  extremities normal, atraumatic, no cyanosis or edema  · Musculokeletal: No joint swelling, no muscle tenderness. ROM normal in all joints of extremities.    · Neurologic: Mental status: Alert, oriented, thought content appropriate, thought content exhibits logical connections, tangential connections  Subject  Pertinent Labs & Imaging Studies   juanita  BMP:    Lab Results   Component Value Date     04/06/2020    K 3.7 04/06/2020    K 4.6 04/04/2020     04/06/2020    CO2 19 04/06/2020    BUN 14 04/06/2020    LABALBU 3.9 04/04/2020    CREATININE 1.1 04/06/2020    CALCIUM 8.7 04/06/2020    GFRAA >60 04/06/2020    LABGLOM 50 04/06/2020    GLUCOSE 103 04/06/2020       Resident's Assessment and Plan     Randy Alejandra is a 71 y.o. female    1. Fall   - Mechanical?, but likely 2/2 dehydration and low BP  - Patient stated that she tripped and fell  - BP after the fall was 54/32 with a repeat of 74/40  - CT head and CT C-spine unremarkable  - S/p 2 L IVF in the ED  - Continue to monitor vitals  - Orthostatics negative   - PTOT ordered               -  2.  GORAN, likely prerenal 2/2 dehydration and poor oral intake, as well as use of ACEI with ARB  - BUN/creatinine 52/2.2.  Normal baseline creatinine of 1.0 on 3/26  - Was taking lisinopril 10 and losartan 50 at [facility]   - S/p 2 L IVF in ED   - FeNa 1.2%, intrinsic, likely from combination of ACEI and ARB  - Cr trending down, 1.1 this morning   - Continue to monitor BMP q12  - Discontinued IVF 4/6              -  3.  Hypovolemic hyponatremia likely 2/2 dehydration, poor oral intake/solute intake - resolved   - Na 129, Cl 94 on admission  -Monitor BMP every 12 hours  - Resolved               -  4. HAGMA likely 2/2 starvation ketosis, rule out infection - resolved   -Patient admitted to poor oral intake for the last couple of weeks  -CO2 15, AG 20  -Lactic acid 0.9  -CXR unremarkable, no URI symptoms  -Blood and urine culture sent to rule out infectious etiology  - UDs negative, SDS positive for TCA (was a home medication)               -  5.  Transaminitis  -AST/ALT 91/36, [id number] with alcoholic picture  -Patient has been at [facility] since 3/27, and denied any recent drinking  -We will monitor for withdrawals, no need for CIWA at this point  - Hep C ab negative               -  6.  Anxiety  -On Ativan 1 mg twice daily as needed  -We will put her on Ativan 0.5 mg twice daily as needed  - Can give Haloperidol with benadryl if really agitated combative   - Seroquel d/c by Psych               -  7.  Chronic neck

## 2020-04-06 NOTE — PROGRESS NOTES
OCCUPATIONAL THERAPY INITIAL EVALUATION      Date:2020  Patient Name: Allyssa Rosales  MRN: [id number]  : 1955  Room: [address] [place] [address]    Referring Physician:  Isidra Anne MD    Evaluating OT:  BROOKLYNN Whitmore, OTR/L #[id number]      AM-PAC Daily Activity Raw Score:    Recommended Adaptive Equipment:  TBD as pt progresses     Reason for Admission:  Pt was admitted after falling at SNF, Hypotensive    Diagnosis:  Closed Head Injury, Fall      Procedures this admission:  None     Pertinent Medical History:  HTN, Back Surgery     Precautions:  Falls      Home Living: Pt provided little information about home set up. Was Transferred from [facility]. Bathroom setup:  Walk-in-Shower, standard Commode   Equipment owned:  None    Available Family Assist:   staff assist    Prior Level of Function:  IND with ADLs, IADLs, Transfers and Mobility using No AD for ambulation. Driving:  Not reported  Occupation:  None reported    Pain Level:  Denies Pain ;  Nsg Notified   Additional Complaints:  None    Vitals/Lab Values:  WFL, Room Air    Cognition: A & O x 3 - generally Oriented   Able to Follow Multi-Step Commands INDly   Memory:  good (-)   Sequencing:  good (-)   Problem solving:  good (-)   Judgement/safety:  good (-)  Additional Comments:  Pt was very pleasant, cooperative, Very Appropriately interacting with staff. Dressed in [employer], seated in b/s chair.    reported pt has had intermittent bouts of Confusion/Disorientation throughout the day - RN aware       Functional Assessment:   Initial Eval Status  Date: 20 Treatment Status  Date: Short Term/Long Term Goals  Treatment frequency: NA   Feeding IND      NA   Grooming IND    Able to perform tasks while standing at the sink, No LOB  NA   UB Dressing IND    Per Staff, pt INDly dressed self in street clothes this date  NA   LB Dressing IND    Per Staff, Pt INDly dressed self in street clothes/Jeans this date  NA   Bathing NT    Pt declined  NA   Toileting NT    Pt declined  NA   Bed Mobility  Rolling:  NT  Repositioning:  NT   Supine to Sit:  NT    Sit to Supine:  NT     NT   NA   Functional Transfers Sit to stand:  IND  Stand to sit:  IND      Chair 2x  Pt ed re: safety/hand placement  NA   Functional Mobility IND w/o AD    No LOB w/ turning in place in bathroom during ADLs, + Short distance in room ~ 20' 2x  NA   Balance Sitting:       Static:  IND    Dynamic:  IND w/ functional ax    Standing:       Static:  IND    Dynamic:  IND w/ functional ax/mobility w/o AD     Activity Tolerance Tolerated Sitting:  Extended time in chair w/ functional ax  Tolerated Standing:  ~ 4 mins w/ functional ax     Visual/  Perceptual WFL  Glasses:  No      Hearing WFL  Hearing Aids  No       Hand dominance: Right    UE ROM: RUE:  WFL      LUE:  WFL    Strength: RUE: grossly 5-/5     LUE: grossly 5-/5     Strength:  WFL Matthew UEs    Fine Motor Coordination:  WFL Matthew UEs    Sensation:  Denies numbness or tingling Matthew UEs  Tone:  WFL Matthew UEs  Edema:  None Noted                            Upon arrival, pt was found seated in b/s chair.  in Room. She was agreeable to participate in therapeutic ax. No family members present during session. Received permission from RN prior to engaging pt in OT services. Treatment:      Provided Skilled SUP/Assist w/ Pt safety, Proper Positioning, ADLs, Transfers and Functional Mobility as noted above, as well as set up and clean up for session. Skilled monitoring of Vitals and pts response to treatment. Consulted RN    -- Education:  Provided Pt/Family ed re: Benefits/Purpose of OT services;  OT Plan of Care;  Safety w/ Functional Transfers/mobility;   Benefits of/Techs for use of DME/AD/Adaptive equip/techs to increase safety/IND with Functional Ax - Reacher for Item Retrieval d/t Chronic LBP,  Techs for Gentle Stretching for Chronic LBP and Cervical Pain;  Techs to increase Safety/Safety Awareness w/ Functional Ax;  Pain mgmt Techs;        Pt and/or Family verbalized/demonstrated a Good understanding of education provided. Will Review PRN. At the end of the session, patient was properly positioned in b/s chair. Call light and phone within reach, all lines and tubes intact. Oriented pt to call bell. Made all appropriate Environmental Modifications to facilitate pt's level of IND and safety. All needs met.  Remained at B/S. Assessment of current deficits   Functional mobility []  ADLs [] Strength []  Cognition [x]  Functional transfers  [] IADLs [x] Safety Awareness [x]  Endurance [x]  Fine Motor Coordination [] Balance [] Vision/perception [] Sensation []   Gross Motor Coordination [] ROM [] Delirium []                  Motor Control []    Plan of Care: No Further OT services recommended at this time d/t Pt's current level of safety and IND with all ADLs, Transfers and Functional Mobility. Pt would benefit from continued skilled OT services to increase safety and independence with completion of ADL/IADL tasks for functional independence and quality of life. Pt/Family actively participated in the establishment of goals. Rehab Potential:  Good     Patient / Family Goal:  Return Home at D/C     Patient and/or Family were instructed on Functional Diagnosis, Prognosis/Goals and OT Plan of Care. Demonstrated Good understanding. Evaluation Time includes thorough review of current medical information, gathering information on past medical history/social history and prior level of function, completion of standardized testing/informal observation of tasks, assessment of data and education on plan of care and goals.      Eval Complexity: Low  Profile and History - Low  Assessment of Occupational Performance and Identification of Deficits - Low  Clinical Decision Making - Low       Low Evaluation + 7 timed treatment minutes    Treatment Time In:  1501              Treatment Time Out:  3991    Treatment Charges: Mins Units   Ther Ex  67887     Manual Therapy 36065     Thera Activities 40176     ADL/Home Mgt 24099 7 0   Neuro Re-ed 42379     Group Therapy      Orthotic manage/training  95342     Non-Billable Time     Total Timed Treatment 7 0         Que Mendoza, OTR/L #[id number]

## 2020-04-07 VITALS
BODY MASS INDEX: 22.82 KG/M2 | RESPIRATION RATE: 18 BRPM | TEMPERATURE: 99 F | OXYGEN SATURATION: 97 % | WEIGHT: 124 LBS | DIASTOLIC BLOOD PRESSURE: 55 MMHG | HEIGHT: 62 IN | SYSTOLIC BLOOD PRESSURE: 111 MMHG | HEART RATE: 96 BPM

## 2020-04-07 LAB
ANION GAP SERPL CALCULATED.3IONS-SCNC: 10 MMOL/L (ref 7–16)
BUN BLDV-MCNC: 7 MG/DL (ref 8–23)
CALCIUM SERPL-MCNC: 8.8 MG/DL (ref 8.6–10.2)
CHLORIDE BLD-SCNC: 105 MMOL/L (ref 98–107)
CO2: 22 MMOL/L (ref 22–29)
CREAT SERPL-MCNC: 1.1 MG/DL (ref 0.5–1)
GFR AFRICAN AMERICAN: >60
GFR NON-AFRICAN AMERICAN: 50 ML/MIN/1.73
GLUCOSE BLD-MCNC: 104 MG/DL (ref 74–99)
POTASSIUM SERPL-SCNC: 3.9 MMOL/L (ref 3.5–5)
SODIUM BLD-SCNC: 137 MMOL/L (ref 132–146)
URINE CULTURE, ROUTINE: NORMAL

## 2020-04-07 PROCEDURE — 36415 COLL VENOUS BLD VENIPUNCTURE: CPT

## 2020-04-07 PROCEDURE — 80048 BASIC METABOLIC PNL TOTAL CA: CPT

## 2020-04-07 PROCEDURE — 6370000000 HC RX 637 (ALT 250 FOR IP): Performed by: INTERNAL MEDICINE

## 2020-04-07 PROCEDURE — 99238 HOSP IP/OBS DSCHRG MGMT 30/<: CPT | Performed by: INTERNAL MEDICINE

## 2020-04-07 PROCEDURE — 2580000003 HC RX 258: Performed by: INTERNAL MEDICINE

## 2020-04-07 RX ADMIN — Medication 10 ML: at 08:35

## 2020-04-07 RX ADMIN — CYCLOBENZAPRINE 10 MG: 10 TABLET, FILM COATED ORAL at 08:34

## 2020-04-07 RX ADMIN — FLUTICASONE PROPIONATE 2 SPRAY: 50 SPRAY, METERED NASAL at 08:34

## 2020-04-07 ASSESSMENT — PAIN SCALES - GENERAL: PAINLEVEL_OUTOF10: 0

## 2020-04-07 NOTE — DISCHARGE SUMMARY
Reason for Stopping:               Activity: activity as tolerated  Diet: regular diet    Follow-up appointments:   1.  PCP    Patient Instructions:   - Be compliant with medication  - Please stop taking Lisinopril and losartan (blood pressure medications) to prevent further worsening in kidney function and drop in BP leading to falls   - Your blood pressure has been stable during your hospital stay, If elevated and need to resume a medication, may resume Losartan 50mg only   - Psychiatry has stopped Seroquel, may follow up with facility regarding need to restart     Devan Moses MD  PGY 1   1:14 PM 4/7/2020

## 2020-04-07 NOTE — PROGRESS NOTES
[facility]  Internal Medicine Residency / [address]    Attending Physician Statement  I have discussed the case, including pertinent history and exam findings with the resident and the team.  I have seen and examined the patient, reviewed meds and pertinent labs and the key elements of the encounter have been performed by me. I agree with the assessment, plan and orders as documented by the resident. Patient has no complaints, PT/OT consults noted. Ok to transfer back to [place] [id number].. Remainder of medical problems as per resident note.       Gina Arriaza  Internal Medicine Residency Faculty

## 2020-04-09 LAB
BLOOD CULTURE, ROUTINE: NORMAL
CULTURE, BLOOD 2: NORMAL

## 2020-11-03 PROBLEM — N17.9 AKI (ACUTE KIDNEY INJURY) (HCC): Status: RESOLVED | Noted: 2020-04-04 | Resolved: 2020-11-03
